# Patient Record
Sex: MALE | Race: ASIAN | NOT HISPANIC OR LATINO
[De-identification: names, ages, dates, MRNs, and addresses within clinical notes are randomized per-mention and may not be internally consistent; named-entity substitution may affect disease eponyms.]

---

## 2019-06-07 ENCOUNTER — APPOINTMENT (OUTPATIENT)
Dept: OTOLARYNGOLOGY | Facility: CLINIC | Age: 84
End: 2019-06-07
Payer: COMMERCIAL

## 2019-06-07 VITALS
DIASTOLIC BLOOD PRESSURE: 80 MMHG | HEIGHT: 61 IN | HEART RATE: 69 BPM | WEIGHT: 139 LBS | BODY MASS INDEX: 26.24 KG/M2 | SYSTOLIC BLOOD PRESSURE: 132 MMHG

## 2019-06-07 DIAGNOSIS — J34.2 DEVIATED NASAL SEPTUM: ICD-10-CM

## 2019-06-07 DIAGNOSIS — J31.0 CHRONIC RHINITIS: ICD-10-CM

## 2019-06-07 DIAGNOSIS — H61.23 IMPACTED CERUMEN, BILATERAL: ICD-10-CM

## 2019-06-07 DIAGNOSIS — R43.8 OTHER DISTURBANCES OF SMELL AND TASTE: ICD-10-CM

## 2019-06-07 DIAGNOSIS — H90.3 SENSORINEURAL HEARING LOSS, BILATERAL: ICD-10-CM

## 2019-06-07 PROCEDURE — 99203 OFFICE O/P NEW LOW 30 MIN: CPT | Mod: 25

## 2019-06-07 PROCEDURE — 69210 REMOVE IMPACTED EAR WAX UNI: CPT

## 2019-06-07 PROCEDURE — 31231 NASAL ENDOSCOPY DX: CPT

## 2019-06-07 RX ORDER — FLUTICASONE PROPIONATE 50 UG/1
50 SPRAY, METERED NASAL DAILY
Qty: 1 | Refills: 5 | Status: ACTIVE | COMMUNITY
Start: 2019-06-07 | End: 1900-01-01

## 2019-06-07 NOTE — REVIEW OF SYSTEMS
[Hearing Loss] : hearing loss [Sense Of Smell Problem] : sense of smell problem [Problem Snoring] : problem snoring [Negative] : Endocrine

## 2019-06-07 NOTE — PHYSICAL EXAM
[FreeTextEntry1] : General:\par The patient was alert and oriented and in no distress.\par Voice was clear.\par \par Face:\par The patient had no facial asymmetry or mass.\par The skin was unremarkable.\par \par Eyes:\par The pupils were equal round and reactive to light and accommodation.\par There was no significant nice diagnosis or discount to get gaze noted.\par \par Nose: \par The external nose had no significant deformity.  There was no facial tenderness.  On anterior rhinoscopy, the nasal mucosa was clear.  The anterior septum was midline.  There were no visualized polyps purulence  or masses.\par \par Oral cavity:\par The oral mucosa was normal.\par The oral and base of tongue were clear and without mass.\par The gingival and buccal mucosa were moist and without lesions.\par The palate moved well.\par There was no cleft to the palate.\par There appeared to be good salivary flow.  \par There was no pus, erythema or mass in the oral cavity.\par \par \par Ears:\par  Procedure note:\par  Removal of Cerumen Impactions, both ears:  03325-55\par Both external ears were normal.\par There were symptomatic cerumen impactions in both ears.  These were cleared microscopically without trauma using both suction and curettes.  After clearing,  both ear canals were clear both eardrums were intact and mobile.  \par \par Neck: \par The neck was symmetrical.\par The parotid and submandibular glands were normal without masses.\par The trachea was midline and there was no unusual crepitus.\par The thyroid was smooth and nontender and no masses were palpated.\par There was no significant cervical adenopathy.\par \par Eyes:\par The pupils were equal round and reactive to light and accommodation.\par There was no significant nystagmus or disconjugate gaze noted.\par \par Neuro:\par Neurologically, the patient was awake, alert, and oriented to person, place and time. There were no obvious focal neurologic abnormalities.  Cranial nerves II through XII were grossly intact.\par \par \par TMJ:\par The temporomandibular joints were nontender.\par There was no abnormal crepitus and no significant malocclusion\par \par \par \par Nasal endoscopy: \par \par Procedure Note:\par \par Nasal endoscopy was done with topical anesthesia and a fiberoptic endoscope.\par Indication: Nasal congestion, rule out sinusitis.\par Procedure: The nasal cavity was anesthetized with topical Afrin and Pontocaine. An  endoscope was used and inserted into the nasal cavity.  This showed that the nasal mucosa was slightly boggy.  There was a moderate S-shaped septal deflection.  However, the middle meati were open.  There were no polyps, purulence or masses.  The eustachian tube orifices were clear.  The nasopharynx was benign and without mass.  The inferior and middle turbinates were slightly boggy, the superior meati were normal and the sphenoethmoidal recesses were without evidence of disease.\par \par \par \par [facility] Testing was done. The patient had    4 out over 12 correct responses which puts the patient in the        37th    percentile for age and sex

## 2019-06-07 NOTE — HISTORY OF PRESENT ILLNESS
[de-identified] : JANEL HALEY is a 87 year old male who comes in complaining of Diminished smell and hearing losses. He does have hearing aids but it is not really aware of the much. He is a retired a  from the [employer]. This has been slowly progressive. He notes that he can taste sweet but his wife was concerned about his not smelling if something is burning. He denies nasal congestion or purulence.The patient had no other ear nose or throat complaints at this visit.

## 2019-06-07 NOTE — CONSULT LETTER
[Dear  ___] : Dear  [unfilled], [Please see my note below.] : Please see my note below. [Courtesy Letter:] : I had the pleasure of seeing your patient, [unfilled], in my office today. [Consult Closing:] : Thank you very much for allowing me to participate in the care of this patient.  If you have any questions, please do not hesitate to contact me. [FreeTextEntry3] : Arian Mccain MD\par [facility]\par [facility]\par  [facility]\par \par  [FreeTextEntry2] : ANNETTE LANZA\par  [Sincerely,] : Sincerely,

## 2019-06-07 NOTE — ASSESSMENT
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 There was no evidence of mass infection or polyps, however. I did not suggests no therapy at this time\par It was my impression that the patient had a cerumen impaction that was cleared.  I recommended topical moisturizing.  I recommended avoiding Q-tips.  I reviewed aural hygiene and the role of cerumen with the patient.  I suggested a repeat visit in a year or earlier should the need arise.\par He has a hearing losses and these are followed elsewhere

## 2019-06-07 NOTE — REASON FOR VISIT
[Hearing Loss] : hearing loss [Initial Evaluation] : an initial evaluation for [FreeTextEntry2] : Loss of smell